# Patient Record
(demographics unavailable — no encounter records)

---

## 2025-05-30 NOTE — REASON FOR VISIT
[Annual] : an annual visit. [Abnormal Uterine Bleeding] : abnormal uterine bleeding [Menopausal Symptoms] : menopausal symptoms

## 2025-05-30 NOTE — HISTORY OF PRESENT ILLNESS
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  kids 6 and 11 no meds all-cipro surgs- none fam hx- htn  lost 30 lbs , not needing htn meds changed diet and workout.  intermittent fasting , carb cycling, lower carb and hit workout, other days higher carb and weight lifting. occ hot flashes , night sweats, periods sl irregular- spotting 5 days then reg periods then spotting days later.  had bad ovulationlike pain last month.  was using macrobid after sex, req

## 2025-05-30 NOTE — REVIEW OF SYSTEMS
[Night Sweats] : night sweats [Abn Vaginal bleeding] : abnormal vaginal bleeding [Pelvic pain] : pelvic pain [Hot Flashes] : hot flashes [Negative] : Heme/Lymph